# Patient Record
Sex: MALE | URBAN - METROPOLITAN AREA
[De-identification: names, ages, dates, MRNs, and addresses within clinical notes are randomized per-mention and may not be internally consistent; named-entity substitution may affect disease eponyms.]

---

## 2020-11-27 ENCOUNTER — APPOINTMENT (RX ONLY)
Dept: URBAN - METROPOLITAN AREA OTHER 11 | Facility: OTHER | Age: 45
Setting detail: DERMATOLOGY
End: 2020-11-27

## 2020-11-27 DIAGNOSIS — L21.8 OTHER SEBORRHEIC DERMATITIS: ICD-10-CM

## 2020-11-27 PROCEDURE — ? PRESCRIPTION

## 2020-11-27 PROCEDURE — 99202 OFFICE O/P NEW SF 15 MIN: CPT

## 2020-11-27 PROCEDURE — ? PRESCRIPTION MEDICATION MANAGEMENT

## 2020-11-27 PROCEDURE — ? COUNSELING

## 2020-11-27 RX ORDER — SODIUM SULFACETAMIDE 100 MG/ML
LIQUID TOPICAL
Qty: 1 | Refills: 11 | Status: ERX | COMMUNITY
Start: 2020-11-27

## 2020-11-27 RX ADMIN — SODIUM SULFACETAMIDE: 100 LIQUID TOPICAL at 00:00

## 2020-11-27 ASSESSMENT — LOCATION SIMPLE DESCRIPTION DERM
LOCATION SIMPLE: RIGHT FOREHEAD
LOCATION SIMPLE: LEFT EYEBROW

## 2020-11-27 ASSESSMENT — LOCATION ZONE DERM: LOCATION ZONE: FACE

## 2020-11-27 ASSESSMENT — LOCATION DETAILED DESCRIPTION DERM
LOCATION DETAILED: RIGHT INFERIOR MEDIAL FOREHEAD
LOCATION DETAILED: LEFT CENTRAL EYEBROW

## 2020-11-27 NOTE — PROCEDURE: PRESCRIPTION MEDICATION MANAGEMENT
Detail Level: Zone
Plan: Okay to use hydrocortisone 2.5% cream for up to one week if too irritated
Otc Regimen: Clotrimazole 1% cream once daily x 5 days during flares
Render In Strict Bullet Format?: No